# Patient Record
Sex: MALE | Race: WHITE | NOT HISPANIC OR LATINO | Employment: UNEMPLOYED | URBAN - NONMETROPOLITAN AREA
[De-identification: names, ages, dates, MRNs, and addresses within clinical notes are randomized per-mention and may not be internally consistent; named-entity substitution may affect disease eponyms.]

---

## 2017-01-01 ENCOUNTER — HOSPITAL ENCOUNTER (OUTPATIENT)
Dept: OTHER | Facility: HOSPITAL | Age: 62
Setting detail: RADIATION/ONCOLOGY SERIES
Discharge: HOME | End: 2017-01-20
Attending: RADIOLOGY | Admitting: RADIOLOGY

## 2017-01-04 LAB
BASOPHILS # BLD AUTO: 0.02 X1000/UL (ref 0–0.2)
BASOPHILS NFR BLD AUTO: 0.5 % (ref 0–2)
CONV AUTO IG#: 0.01 X1000/UL (ref 0.01–0.02)
CONV AUTO IG%: 0.2 % (ref 0–0.5)
EOSINOPHIL # BLD AUTO: 0.07 X1000/UL (ref 0–0.7)
EOSINOPHIL NFR BLD AUTO: 1.7 % (ref 0–7)
ERYTHROCYTE [DISTWIDTH] IN BLOOD: 15.1 % (ref 11.5–14.5)
GRANULOCYTES # BLD AUTO: 3.08 X1000/UL (ref 2–8.6)
GRANULOCYTES NFR BLD AUTO: 75.7 % (ref 37–80)
HCT VFR BLD CALC: 34.7 % (ref 39–49)
HGB BLD-MCNC: 11.8 GM/DL (ref 13.7–17.3)
LYMPHOCYTES # BLD AUTO: 0.54 X1000/UL (ref 0.6–4.2)
LYMPHOCYTES NFR BLD AUTO: 13.3 % (ref 10–50)
MCH RBC QN: 31.2 PG (ref 26–34)
MCHC RBC-ENTMCNC: 34 GM/DL (ref 31.5–36.3)
MCV RBC: 91.8 FL (ref 80–98)
MONOCYTES # BLD AUTO: 0.35 X1000/UL (ref 0–0.9)
MONOCYTES NFR BLD AUTO: 8.6 % (ref 0–12)
PLATELET # BLD: 210 X1000/MM3 (ref 150–450)
PMV BLD: 9.2 FL (ref 8–12)
RBC # BLD: 3.78 MEGA/MM3 (ref 4.37–5.74)
WBC # BLD: 4.1 X1000/UL (ref 3.2–9.8)

## 2017-01-23 RX ORDER — OXYCODONE AND ACETAMINOPHEN 10; 325 MG/1; MG/1
1 TABLET ORAL EVERY 6 HOURS PRN
Qty: 10 TABLET | Refills: 0 | OUTPATIENT
Start: 2017-01-23 | End: 2017-01-23 | Stop reason: SDUPTHER

## 2017-01-23 RX ORDER — OXYCODONE AND ACETAMINOPHEN 10; 325 MG/1; MG/1
1 TABLET ORAL EVERY 6 HOURS PRN
Qty: 10 TABLET | Refills: 0 | OUTPATIENT
Start: 2017-01-23 | End: 2017-01-23

## 2017-03-27 ENCOUNTER — HOSPITAL ENCOUNTER (OUTPATIENT)
Dept: GENERAL RADIOLOGY | Facility: HOSPITAL | Age: 62
Discharge: HOME | End: 2017-03-27

## 2017-03-27 DIAGNOSIS — R13.10 DIFFICULTY IN SWALLOWING: ICD-10-CM

## 2017-03-27 DIAGNOSIS — R13.12 OROPHARYNGEAL DYSPHAGIA: ICD-10-CM

## 2017-03-27 PROCEDURE — G8998 SWALLOW D/C STATUS: HCPCS | Performed by: SPEECH-LANGUAGE PATHOLOGIST

## 2017-03-27 PROCEDURE — G8996 SWALLOW CURRENT STATUS: HCPCS | Performed by: SPEECH-LANGUAGE PATHOLOGIST

## 2017-03-27 PROCEDURE — 92611 MOTION FLUOROSCOPY/SWALLOW: CPT | Performed by: SPEECH-LANGUAGE PATHOLOGIST

## 2017-03-27 PROCEDURE — 74230 X-RAY XM SWLNG FUNCJ C+: CPT

## 2017-03-27 PROCEDURE — G8997 SWALLOW GOAL STATUS: HCPCS | Performed by: SPEECH-LANGUAGE PATHOLOGIST

## 2017-03-27 NOTE — PROGRESS NOTES
Outpatient Speech Language Pathology   Adult Swallow Initial Evaluation  [facility]     Patient Name: Gilberto Londono  : 1955  MRN: [id number]  Today's Date: 3/27/2017         Visit Date: 2017     Modified Barium Swallow completed this date. Pt reports burning sensation however no coughing or choking during meals. Pt post radiation from cancer of the tonsil with involvement of left portion hard palate, tongue, and jaw. Pt presented with thin by cup, nectar by cup, pudding, and deli meat. Pt awaiting dentures at this time. Pt presents with piecemeal swallow, pt exhibits laryngeal penetration on most trials and various consistencies, increased pharyngeal residue as trials progressed; double swallow did aid in clearance of residue; and residue in valleculae noted with meat textures. No difference in laryngeal penetration from thin vs nectar thick consistenices, chin tuck did improve and decrease amount of penetration. No cough or response to the penetration. Pt is reporting a burning sensation but this noted as the penetration episodes. A double swallow and chin tuck were recommended post MBS. SLP recommends continue current diet and use small sips, chin tuck and double swallow to increase swallow safety.  Recommend outpatient speech therapy follow up for dysphagia.          Past Medical History:   Diagnosis Date   • Acute bronchitis    • Acute otitis externa     RIGHT      • Cough    • Impacted cerumen     RIGHT    • Lesion of oral mucosa    • Tobacco dependence syndrome    • Upper respiratory infection         History reviewed. No pertinent surgical history.      Visit Dx:     ICD-10-CM ICD-9-CM   1. Difficulty in swallowing R13.10 787.20   2. Oropharyngeal dysphagia R13.12 787.22                                         OP SLP Assessment/Plan - 17 1652     SLP Assessment    Functional Problems --  -EK      User Key  (r) = Recorded By, (t) = Taken By, (c) = Cosigned By    Initials Name Provider  Type    EK MICHELLE Espitia Speech and Language Pathologist                    Time Calculation:   SLP Start Time: 1030  SLP Stop Time: 1050  SLP Time Calculation (min): 20 min    Therapy Charges for Today     Code Description Service Date Service Provider Modifiers Qty    25413017777 HC ST SWALLOWING CURRENT STATUS 3/27/2017 MICHELLE Espitia GN, CJ 1    09788623955 HC ST SWALLOWING PROJECTED 3/27/2017 MICHELLE Espitia, CJ 1    70298081404 HC ST SWALLOWING DISCHARGE 3/27/2017 MICHELLE Espitia, CJ 1    38881835056 HC ST MOTION FLUORO EVAL SWALLOW 2 3/27/2017 MICHELLE Espitia GN 1          SLP G-Codes  Functional Limitations: Swallowing  Swallow Current Status (): At least 20 percent but less than 40 percent impaired, limited or restricted  Swallow Goal Status (): At least 20 percent but less than 40 percent impaired, limited or restricted  Swallow Discharge Status (): At least 20 percent but less than 40 percent impaired, limited or restricted        MICHELLE Espitia  3/27/2017

## 2017-03-31 ENCOUNTER — TRANSCRIBE ORDERS (OUTPATIENT)
Dept: SPEECH THERAPY | Facility: HOSPITAL | Age: 62
End: 2017-03-31

## 2017-03-31 DIAGNOSIS — C09.9 MALIGNANT NEOPLASM OF TONSIL (HCC): ICD-10-CM

## 2017-03-31 DIAGNOSIS — R13.10 DYSPHAGIA, UNSPECIFIED TYPE: Primary | ICD-10-CM

## 2017-04-12 ENCOUNTER — OFFICE VISIT (OUTPATIENT)
Dept: ONCOLOGY | Facility: CLINIC | Age: 62
End: 2017-04-12

## 2017-04-12 ENCOUNTER — LAB (OUTPATIENT)
Dept: ONCOLOGY | Facility: HOSPITAL | Age: 62
End: 2017-04-12

## 2017-04-12 VITALS
TEMPERATURE: 97.9 F | HEART RATE: 83 BPM | WEIGHT: 127 LBS | SYSTOLIC BLOOD PRESSURE: 106 MMHG | DIASTOLIC BLOOD PRESSURE: 68 MMHG | BODY MASS INDEX: 18.18 KG/M2 | HEIGHT: 70 IN

## 2017-04-12 DIAGNOSIS — C09.0 CANCER OF TONSILLAR FOSSA (HCC): Primary | ICD-10-CM

## 2017-04-12 DIAGNOSIS — C09.0 CANCER OF TONSILLAR FOSSA (HCC): ICD-10-CM

## 2017-04-12 LAB
ALBUMIN SERPL-MCNC: 4.6 G/DL (ref 3.4–4.8)
ALBUMIN/GLOB SERPL: 1.6 G/DL (ref 1.1–1.8)
ALP SERPL-CCNC: 74 U/L (ref 38–126)
ALT SERPL W P-5'-P-CCNC: 27 U/L (ref 21–72)
ANION GAP SERPL CALCULATED.3IONS-SCNC: 12 MMOL/L (ref 5–15)
AST SERPL-CCNC: 33 U/L (ref 17–59)
BASOPHILS # BLD AUTO: 0.03 10*3/MM3 (ref 0–0.2)
BASOPHILS NFR BLD AUTO: 0.6 % (ref 0–2)
BILIRUB SERPL-MCNC: 0.4 MG/DL (ref 0.2–1.3)
BUN BLD-MCNC: 19 MG/DL (ref 7–21)
BUN/CREAT SERPL: 23.5 (ref 7–25)
CALCIUM SPEC-SCNC: 9.3 MG/DL (ref 8.4–10.2)
CHLORIDE SERPL-SCNC: 100 MMOL/L (ref 95–110)
CO2 SERPL-SCNC: 28 MMOL/L (ref 22–31)
CREAT BLD-MCNC: 0.81 MG/DL (ref 0.7–1.3)
DEPRECATED RDW RBC AUTO: 40.6 FL (ref 35.1–43.9)
EOSINOPHIL # BLD AUTO: 0.09 10*3/MM3 (ref 0–0.7)
EOSINOPHIL NFR BLD AUTO: 1.8 % (ref 0–7)
ERYTHROCYTE [DISTWIDTH] IN BLOOD BY AUTOMATED COUNT: 12 % (ref 11.5–14.5)
GFR SERPL CREATININE-BSD FRML MDRD: 96 ML/MIN/1.73 (ref 49–113)
GLOBULIN UR ELPH-MCNC: 2.8 GM/DL (ref 2.3–3.5)
GLUCOSE BLD-MCNC: 90 MG/DL (ref 60–100)
HCT VFR BLD AUTO: 38.8 % (ref 39–49)
HGB BLD-MCNC: 13.3 G/DL (ref 13.7–17.3)
IMM GRANULOCYTES # BLD: 0.01 10*3/MM3 (ref 0–0.02)
IMM GRANULOCYTES NFR BLD: 0.2 % (ref 0–0.5)
LYMPHOCYTES # BLD AUTO: 0.9 10*3/MM3 (ref 0.6–4.2)
LYMPHOCYTES NFR BLD AUTO: 17.6 % (ref 10–50)
MCH RBC QN AUTO: 31.7 PG (ref 26.5–34)
MCHC RBC AUTO-ENTMCNC: 34.3 G/DL (ref 31.5–36.3)
MCV RBC AUTO: 92.6 FL (ref 80–98)
MONOCYTES # BLD AUTO: 0.52 10*3/MM3 (ref 0–0.9)
MONOCYTES NFR BLD AUTO: 10.2 % (ref 0–12)
NEUTROPHILS # BLD AUTO: 3.55 10*3/MM3 (ref 2–8.6)
NEUTROPHILS NFR BLD AUTO: 69.6 % (ref 37–80)
PLATELET # BLD AUTO: 177 10*3/MM3 (ref 150–450)
PMV BLD AUTO: 9 FL (ref 8–12)
POTASSIUM BLD-SCNC: 4.6 MMOL/L (ref 3.5–5.1)
PROT SERPL-MCNC: 7.4 G/DL (ref 6.3–8.6)
RBC # BLD AUTO: 4.19 10*6/MM3 (ref 4.37–5.74)
SODIUM BLD-SCNC: 140 MMOL/L (ref 137–145)
WBC NRBC COR # BLD: 5.1 10*3/MM3 (ref 3.2–9.8)

## 2017-04-12 PROCEDURE — 99213 OFFICE O/P EST LOW 20 MIN: CPT | Performed by: INTERNAL MEDICINE

## 2017-04-12 PROCEDURE — G0463 HOSPITAL OUTPT CLINIC VISIT: HCPCS | Performed by: INTERNAL MEDICINE

## 2017-04-12 PROCEDURE — 85025 COMPLETE CBC W/AUTO DIFF WBC: CPT | Performed by: INTERNAL MEDICINE

## 2017-04-12 PROCEDURE — 36415 COLL VENOUS BLD VENIPUNCTURE: CPT | Performed by: INTERNAL MEDICINE

## 2017-04-12 PROCEDURE — 80053 COMPREHEN METABOLIC PANEL: CPT | Performed by: INTERNAL MEDICINE

## 2017-04-12 RX ORDER — ALPRAZOLAM 1 MG/1
TABLET ORAL
Refills: 0 | COMMUNITY
Start: 2017-02-25 | End: 2018-03-16

## 2017-04-12 RX ORDER — OXYCODONE AND ACETAMINOPHEN 10; 325 MG/1; MG/1
TABLET ORAL
Refills: 0 | COMMUNITY
Start: 2017-03-02

## 2017-04-12 RX ORDER — PROMETHAZINE HYDROCHLORIDE 25 MG/1
TABLET ORAL
Refills: 0 | COMMUNITY
Start: 2017-02-21

## 2017-04-12 NOTE — PROGRESS NOTES
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  For which patient underwent left tonsillectomy with neck dissection by Dr. Seymour in [place].  Subsequently patient was seen in consultation here by Dr. Mccracken and was started on concurrent chemotherapy radiation therapy with weekly carboplatin and Taxol.  Patient could not tolerate more than 3 dose of weekly carboplatin and Taxol and subsequently ended up finishing his radiation without chemotherapy in January 2017.  Patient is here for follow-up visit today.  Still complains of some burning sensation on the left side of the tongue and hearing difficulty in the left ear.  Denies any abnormal swollen and anywhere in the body.  States his appetite is returning and he has gained more than 10 pounds since finishing treatment.    PAST MEDICAL HISTORY:    Past Medical History:   Diagnosis Date   • Acute bronchitis    • Acute otitis externa     RIGHT      • Cough    • Impacted cerumen     RIGHT    • Lesion of oral mucosa    • Tobacco dependence syndrome    • Upper respiratory infection        SOCIAL HISTORY:    Social History   Substance Use Topics   • Smoking status: Former Smoker     Packs/day: 1.50     Years: 44.00     Types: Cigarettes     Quit date: 9/20/2016   • Smokeless tobacco: None   • Alcohol use No       Surgical History :  No past surgical history on file.    ALLERGIES:    Allergies   Allergen Reactions   • Codeine Nausea And Vomiting       REVIEW OF SYSTEMS:      CONSTITUTIONAL:  No fever, chills, or night sweats.     HEENT: Complains of difficulty swallowing.  Complains of burning sensation on the left side of tongue.  No epistaxis, mouth sores.    RESPIRATORY:  No new shortness of breath or cough at  "present.    CARDIOVASCULAR:  No chest pain or palpitations.    GASTROINTESTINAL:  No abdominal pain, nausea, vomiting, or blood in the stool.    GENITOURINARY:  No dysuria or hematuria.    MUSCULOSKELETAL:  No any new back pain or arthralgias.     NEUROLOGICAL:  No tingling or numbness. No new headache or dizziness.     LYMPHATICS:  Denies any abnormal swollen and anywhere in the body.    SKIN:  Erythema on the neck on the left side.        PHYSICAL EXAMINATION:      VITAL SIGNS:  /68  Pulse 83  Temp 97.9 °F (36.6 °C)  Ht 70\" (177.8 cm)  Wt 127 lb (57.6 kg)  BMI 18.22 kg/m2    GENERAL:  Not in any distress.    HEENT:  Normocephalic, Atraumatic.Mild Conjunctival pallor. No icterus. Extraocular Movements Intact. No Fascial Asymmetry noted.    NECK:  No adenopathy. No JVD.  Evidence of erythema and skin changes from radiation on the left side of neck.    RESPIRATORY:  Fair air entry bilateral. No rhonchi or wheezing.    CARDIOVASCULAR:  S1, S2. Regular rate and rhythm. No murmur or gallop appreciated.    ABDOMEN:  Soft, obese, nontender. Bowel sounds present in all four quadrants.  No organomegaly appreciated.    EXTREMITIES:  No edema.No Calf Tenderness.    NEUROLOGIC:  Alert, awake and oriented ×3.  No  Motor or sensory deficit appreciated. Cranial Nerves 2-12 grossly intact.          DIAGNOSTIC DATA:    Glucose   Date Value Ref Range Status   04/12/2017 90 60 - 100 mg/dL Final     Sodium   Date Value Ref Range Status   04/12/2017 140 137 - 145 mmol/L Final     Potassium   Date Value Ref Range Status   04/12/2017 4.6 3.5 - 5.1 mmol/L Final     CO2   Date Value Ref Range Status   04/12/2017 28.0 22.0 - 31.0 mmol/L Final     Chloride   Date Value Ref Range Status   04/12/2017 100 95 - 110 mmol/L Final     Anion Gap   Date Value Ref Range Status   04/12/2017 12.0 5.0 - 15.0 mmol/L Final     Creatinine   Date Value Ref Range Status   04/12/2017 0.81 0.70 - 1.30 mg/dL Final     BUN   Date Value Ref Range Status "   04/12/2017 19 7 - 21 mg/dL Final     BUN/Creatinine Ratio   Date Value Ref Range Status   04/12/2017 23.5 7.0 - 25.0 Final     Calcium   Date Value Ref Range Status   04/12/2017 9.3 8.4 - 10.2 mg/dL Final     eGFR Non  Amer   Date Value Ref Range Status   04/12/2017 96 49 - 113 mL/min/1.73 Final     Alkaline Phosphatase   Date Value Ref Range Status   04/12/2017 74 38 - 126 U/L Final     Total Protein   Date Value Ref Range Status   04/12/2017 7.4 6.3 - 8.6 g/dL Final     ALT (SGPT)   Date Value Ref Range Status   04/12/2017 27 21 - 72 U/L Final     AST (SGOT)   Date Value Ref Range Status   04/12/2017 33 17 - 59 U/L Final     Total Bilirubin   Date Value Ref Range Status   04/12/2017 0.4 0.2 - 1.3 mg/dL Final     Albumin   Date Value Ref Range Status   04/12/2017 4.60 3.40 - 4.80 g/dL Final     Globulin   Date Value Ref Range Status   04/12/2017 2.8 2.3 - 3.5 gm/dL Final     A/G Ratio   Date Value Ref Range Status   04/12/2017 1.6 1.1 - 1.8 g/dL Final     Lab Results   Component Value Date    WBC 5.10 04/12/2017    HGB 13.3 (L) 04/12/2017    HCT 38.8 (L) 04/12/2017    MCV 92.6 04/12/2017     04/12/2017     Lab Results   Component Value Date    NEUTROABS 3.55 04/12/2017           ASSESSMENT AND PLAN:      1.  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  Patient did have 2 lymph nodes positive at the time of surgery.  Patient was seen in consultation by  on October 21, 2016.  Patient did get 3 weekly dose of carboplatin and Taxol from November 21, 2016 until December 5, 2016 with radiation.  After that in view of side effects from chemotherapy patient decided not to undergo any more chemotherapy and finished radiation on tin 2017 on January 19, 2017.  Patient was recently seen by Dr. Gant in [place] and had a ENT exam done which showed did not reveal any evidence of disease.  At this point we will continue with clinical surveillance.  We  will see him back in about 4 months with a repeat CBC and CMP on that day.  Patient was encouraged to keep following up with Dr. Gant in [place] as well as Dr. Justice Shelley.    2.  History of nicotine addiction    3.  Rheumatoid arthritis    4.  Health maintenance: Patient does not smoke.  He remains full code.         Keagan Alberts MD  4/12/2017  3:39 PM        EMR Dragon/Transcription disclaimer:   Much of this encounter note is an electronic transcription/translation of spoken language to printed text. The electronic translation of spoken language may permit erroneous, or at times, nonsensical words or phrases to be inadvertently transcribed; Although I have reviewed the note for such errors, some may still exist.

## 2017-04-24 ENCOUNTER — APPOINTMENT (OUTPATIENT)
Dept: SPEECH THERAPY | Facility: HOSPITAL | Age: 62
End: 2017-04-24

## 2017-08-07 ENCOUNTER — TELEPHONE (OUTPATIENT)
Dept: GENERAL RADIOLOGY | Facility: HOSPITAL | Age: 62
End: 2017-08-07

## 2017-08-28 ENCOUNTER — DOCUMENTATION (OUTPATIENT)
Dept: NUTRITION | Facility: HOSPITAL | Age: 62
End: 2017-08-28

## 2017-08-29 ENCOUNTER — OFFICE VISIT (OUTPATIENT)
Dept: GASTROENTEROLOGY | Facility: CLINIC | Age: 62
End: 2017-08-29

## 2017-08-29 VITALS
HEIGHT: 70 IN | HEART RATE: 65 BPM | WEIGHT: 130.8 LBS | BODY MASS INDEX: 18.73 KG/M2 | DIASTOLIC BLOOD PRESSURE: 70 MMHG | SYSTOLIC BLOOD PRESSURE: 129 MMHG

## 2017-08-29 DIAGNOSIS — R63.4 WEIGHT LOSS, ABNORMAL: Primary | ICD-10-CM

## 2017-08-29 PROCEDURE — 99213 OFFICE O/P EST LOW 20 MIN: CPT | Performed by: INTERNAL MEDICINE

## 2017-08-29 NOTE — PROGRESS NOTES
[facility]      Chief Complaint:   Chief Complaint   Patient presents with   • Abdominal Pain     around peg tube   • GI Problem     peg tube removal       Subjective     HPI:   Patient for PEG removal.  Patient states no abdominal pain with the PEG but does get some discharge from around patient is no longer using his PEG.    Plan; we'll remove the PEG today as patient is no longer using this.    Past Medical History:   Past Medical History:   Diagnosis Date   • Acute bronchitis    • Acute otitis externa     RIGHT      • Cough    • Impacted cerumen     RIGHT    • Lesion of oral mucosa    • Tobacco dependence syndrome    • Upper respiratory infection        Family History:  History reviewed. No pertinent family history.    Social History:   reports that he quit smoking about 11 months ago. His smoking use included Cigarettes. He has a 66.00 pack-year smoking history. He has never used smokeless tobacco. He reports that he does not drink alcohol or use illicit drugs.    Medications:   Current Outpatient Prescriptions   Medication Sig Dispense Refill   • ALPRAZolam (XANAX) 1 MG tablet TAKE 1 TABLET FOUR TIMES DAILY FOR PAIN  0   • gabapentin (NEURONTIN) 600 MG tablet Take 600 mg by mouth 3 (three) times a day as needed.     • oxyCODONE-acetaminophen (PERCOCET)  MG per tablet TAKE 1 TABLET BY MOUTH SIX TIMES EVERY DAY FOR 28 DAYS  0   • promethazine (PHENERGAN) 25 MG tablet TAKE 1 TABLET EVERY 4 TO 6 HOURS  0     No current facility-administered medications for this visit.        Allergies:  Codeine    ROS:    Review of Systems   Constitutional: Negative for activity change, appetite change, chills, diaphoresis, fatigue, fever and unexpected weight change.   HENT: Negative for sore throat and trouble swallowing.    Respiratory: Negative for shortness of breath.    Gastrointestinal: Negative for abdominal distention, abdominal pain, anal bleeding, blood in stool, constipation, diarrhea,  "nausea, rectal pain and vomiting.   Musculoskeletal: Negative for arthralgias.   Skin: Negative for pallor.   Neurological: Negative for light-headedness.     Objective     Blood pressure 129/70, pulse 65, height 70\" (177.8 cm), weight 130 lb 12.8 oz (59.3 kg).    Physical Exam   Constitutional: He is oriented to person, place, and time. He appears well-developed and well-nourished. No distress.   HENT:   Head: Normocephalic and atraumatic.   Cardiovascular: Normal rate, regular rhythm, normal heart sounds and intact distal pulses.  Exam reveals no gallop and no friction rub.    No murmur heard.  Pulmonary/Chest: Breath sounds normal. No respiratory distress. He has no wheezes. He has no rales. He exhibits no tenderness.   Abdominal: Soft. Bowel sounds are normal. He exhibits no distension and no mass. There is no tenderness. There is no rebound and no guarding. No hernia.   Musculoskeletal: Normal range of motion. He exhibits no edema.   Neurological: He is alert and oriented to person, place, and time.   Skin: Skin is warm and dry. No rash noted. He is not diaphoretic. No erythema. No pallor.   Psychiatric: He has a normal mood and affect. His behavior is normal. Judgment and thought content normal.        Assessment/Plan   Gilberto was seen today for abdominal pain and gi problem.    Diagnoses and all orders for this visit:    Weight loss, abnormal      * Surgery not found *     Diagnosis Plan   1. Weight loss, abnormal         Anticipated Surgical Procedure:  No orders of the defined types were placed in this encounter.      The risks, benefits, and alternatives of this procedure have been discussed with the patient or the responsible party- the patient understands and agrees to proceed.                                                                "

## 2018-03-05 ENCOUNTER — TELEPHONE (OUTPATIENT)
Dept: ONCOLOGY | Facility: HOSPITAL | Age: 63
End: 2018-03-05

## 2018-03-05 NOTE — TELEPHONE ENCOUNTER
Patient's daughter called and said that patient has been having dizziness and wanted to be seen today. Spoke with Dr. Alberts and he asks for patient to go to the ER to be evaluated. Informed patient and daughter of this. Patient says it has been happening on and off but that he feels better today. Gave patient an appointment to see Dr. Alberts. Patient states understanding and says he will go to the ER.

## 2018-03-06 ENCOUNTER — APPOINTMENT (OUTPATIENT)
Dept: CT IMAGING | Facility: HOSPITAL | Age: 63
End: 2018-03-06

## 2018-03-06 ENCOUNTER — HOSPITAL ENCOUNTER (EMERGENCY)
Facility: HOSPITAL | Age: 63
Discharge: LEFT BEFORE DISCHARGE | End: 2018-03-06
Attending: FAMILY MEDICINE | Admitting: FAMILY MEDICINE

## 2018-03-06 VITALS
BODY MASS INDEX: 20.19 KG/M2 | SYSTOLIC BLOOD PRESSURE: 134 MMHG | HEART RATE: 59 BPM | TEMPERATURE: 98.2 F | WEIGHT: 141 LBS | RESPIRATION RATE: 18 BRPM | OXYGEN SATURATION: 100 % | HEIGHT: 70 IN | DIASTOLIC BLOOD PRESSURE: 74 MMHG

## 2018-03-06 DIAGNOSIS — R42 DIZZINESS: Primary | ICD-10-CM

## 2018-03-06 PROCEDURE — 99282 EMERGENCY DEPT VISIT SF MDM: CPT

## 2018-03-06 NOTE — ED PROVIDER NOTES
Subjective   Patient is a 62 y.o. male presenting with dizziness.   History provided by:  Patient  Dizziness   Quality:  Lightheadedness  Severity:  Mild  Onset quality:  Sudden  Duration: minutes.  Timing: once.  Progression:  Resolved  Chronicity:  New  Context: not when bending over, not with loss of consciousness, not with physical activity and not when standing up    Relieved by:  Nothing  Worsened by:  Nothing  Ineffective treatments:  None tried  Associated symptoms: hearing loss (chronic) and nausea (mild during episode)    Associated symptoms: no blood in stool, no headaches, no palpitations, no shortness of breath, no syncope, no tinnitus, no vision changes, no vomiting and no weakness    Risk factors: hx of vertigo    Risk factors: no heart disease, no hx of stroke and no new medications        Review of Systems   Constitutional: Negative for activity change, appetite change, fatigue, fever and unexpected weight change.   HENT: Positive for hearing loss (chronic). Negative for tinnitus and trouble swallowing.    Eyes: Negative for visual disturbance.   Respiratory: Negative for shortness of breath.    Cardiovascular: Negative for palpitations and syncope.   Gastrointestinal: Positive for nausea (mild during episode). Negative for blood in stool and vomiting.   Musculoskeletal: Negative for neck pain and neck stiffness.   Neurological: Positive for dizziness and light-headedness. Negative for tremors, seizures, syncope, facial asymmetry, speech difficulty, weakness, numbness and headaches.       Past Medical History:   Diagnosis Date   • Acute bronchitis    • Acute otitis externa     RIGHT      • Cancer    • Cough    • Impacted cerumen     RIGHT    • Lesion of oral mucosa    • Tobacco dependence syndrome    • Upper respiratory infection        Allergies   Allergen Reactions   • Codeine Nausea And Vomiting       Past Surgical History:   Procedure Laterality Date   • TONSILLECTOMY     • UPPER  GASTROINTESTINAL ENDOSCOPY  12/08/2016       History reviewed. No pertinent family history.    Social History     Social History   • Marital status:      Social History Main Topics   • Smoking status: Former Smoker     Packs/day: 1.50     Years: 44.00     Types: Cigarettes     Quit date: 9/20/2016   • Smokeless tobacco: Never Used   • Alcohol use No   • Drug use: No   • Sexual activity: Defer           Objective   Physical Exam   Constitutional: He is oriented to person, place, and time. He appears well-developed and well-nourished.   HENT:   Head: Normocephalic and atraumatic.   Eyes: Conjunctivae and EOM are normal. Pupils are equal, round, and reactive to light.   Neck: Normal range of motion. Neck supple. No thyromegaly present.   Cardiovascular: Normal rate, regular rhythm, normal heart sounds and intact distal pulses.  Exam reveals no gallop and no friction rub.    No murmur heard.  Pulmonary/Chest: Effort normal and breath sounds normal.   Musculoskeletal: Normal range of motion. He exhibits no edema, tenderness or deformity.   Lymphadenopathy:     He has no cervical adenopathy.   Neurological: He is alert and oriented to person, place, and time. No cranial nerve deficit.   Psychiatric: He has a normal mood and affect. His behavior is normal. Judgment and thought content normal.       Procedures        Vitals:    03/06/18 1606   BP: 134/74   Pulse: 59   Resp: 18   Temp:    SpO2: 100%         ED Course  ED Course   Value Comment By Time   CT Head Without Contrast (Reviewed) Chris Márquez MD 03/06 1623   CT Head Without Contrast (Reviewed) Chris Márquez MD 03/06 1623    62 year old male with PMH of left sided tonsillar cancer s/p radiation therapy 35x, chemo x2 in remission.  Presented with dizziness episode on Sunday which lasted about 15 minutes, did not lose consiousness. Is a patient of Dr. Alberts, hemeoncology. CT head ordered per Dr. Alberts recommendations to look for metastasis. CT head was not able to  "be completed as patient had to leave due to a \"home emergency\". Patient left AMA. Was informed he will need to follow up with PCP, Dr. Alberts and have a head CT completed.         This document has been electronically signed by Chris Márquez MD on March 7, 2018 10:55 AM                  MDM  Number of Diagnoses or Management Options  Dizziness: new and requires workup     Amount and/or Complexity of Data Reviewed  Tests in the radiology section of CPT®: ordered    Risk of Complications, Morbidity, and/or Mortality  Presenting problems: low    Critical Care  Total time providing critical care: < 30 minutes    Patient Progress  Patient progress: stable      Final diagnoses:   Dizziness            Chris Márquez MD  Resident  03/07/18 1056    "

## 2018-03-06 NOTE — ED NOTES
Pt is stating that he has had an emergency come up and he has to leave now.      Laura Garcia RN  03/06/18 [id number]

## 2018-03-16 ENCOUNTER — LAB (OUTPATIENT)
Dept: ONCOLOGY | Facility: HOSPITAL | Age: 63
End: 2018-03-16

## 2018-03-16 ENCOUNTER — OFFICE VISIT (OUTPATIENT)
Dept: ONCOLOGY | Facility: CLINIC | Age: 63
End: 2018-03-16

## 2018-03-16 VITALS
HEART RATE: 62 BPM | HEIGHT: 70 IN | DIASTOLIC BLOOD PRESSURE: 83 MMHG | WEIGHT: 141.31 LBS | BODY MASS INDEX: 20.23 KG/M2 | RESPIRATION RATE: 16 BRPM | SYSTOLIC BLOOD PRESSURE: 143 MMHG | TEMPERATURE: 97.9 F

## 2018-03-16 DIAGNOSIS — C09.0 CANCER OF TONSILLAR FOSSA (HCC): ICD-10-CM

## 2018-03-16 LAB
ALBUMIN SERPL-MCNC: 4.6 G/DL (ref 3.4–4.8)
ALBUMIN/GLOB SERPL: 1.5 G/DL (ref 1.1–1.8)
ALP SERPL-CCNC: 89 U/L (ref 38–126)
ALT SERPL W P-5'-P-CCNC: 32 U/L (ref 21–72)
ANION GAP SERPL CALCULATED.3IONS-SCNC: 14 MMOL/L (ref 5–15)
AST SERPL-CCNC: 26 U/L (ref 17–59)
BASOPHILS # BLD AUTO: 0.06 10*3/MM3 (ref 0–0.2)
BASOPHILS NFR BLD AUTO: 1.2 % (ref 0–2)
BILIRUB SERPL-MCNC: 0.7 MG/DL (ref 0.2–1.3)
BUN BLD-MCNC: 13 MG/DL (ref 7–21)
BUN/CREAT SERPL: 13.7 (ref 7–25)
CALCIUM SPEC-SCNC: 9.4 MG/DL (ref 8.4–10.2)
CHLORIDE SERPL-SCNC: 100 MMOL/L (ref 95–110)
CO2 SERPL-SCNC: 29 MMOL/L (ref 22–31)
CREAT BLD-MCNC: 0.95 MG/DL (ref 0.7–1.3)
DEPRECATED RDW RBC AUTO: 43 FL (ref 35.1–43.9)
EOSINOPHIL # BLD AUTO: 0.05 10*3/MM3 (ref 0–0.7)
EOSINOPHIL NFR BLD AUTO: 1 % (ref 0–7)
ERYTHROCYTE [DISTWIDTH] IN BLOOD BY AUTOMATED COUNT: 13 % (ref 11.5–14.5)
GFR SERPL CREATININE-BSD FRML MDRD: 80 ML/MIN/1.73 (ref 49–113)
GLOBULIN UR ELPH-MCNC: 3.1 GM/DL (ref 2.3–3.5)
GLUCOSE BLD-MCNC: 92 MG/DL (ref 60–100)
HCT VFR BLD AUTO: 44 % (ref 39–49)
HGB BLD-MCNC: 15.2 G/DL (ref 13.7–17.3)
IMM GRANULOCYTES # BLD: 0.01 10*3/MM3 (ref 0–0.02)
IMM GRANULOCYTES NFR BLD: 0.2 % (ref 0–0.5)
LYMPHOCYTES # BLD AUTO: 0.74 10*3/MM3 (ref 0.6–4.2)
LYMPHOCYTES NFR BLD AUTO: 15.3 % (ref 10–50)
MCH RBC QN AUTO: 31.2 PG (ref 26.5–34)
MCHC RBC AUTO-ENTMCNC: 34.5 G/DL (ref 31.5–36.3)
MCV RBC AUTO: 90.3 FL (ref 80–98)
MONOCYTES # BLD AUTO: 0.54 10*3/MM3 (ref 0–0.9)
MONOCYTES NFR BLD AUTO: 11.2 % (ref 0–12)
NEUTROPHILS # BLD AUTO: 3.43 10*3/MM3 (ref 2–8.6)
NEUTROPHILS NFR BLD AUTO: 71.1 % (ref 37–80)
PLATELET # BLD AUTO: 186 10*3/MM3 (ref 150–450)
PMV BLD AUTO: 9 FL (ref 8–12)
POTASSIUM BLD-SCNC: 4.1 MMOL/L (ref 3.5–5.1)
PROT SERPL-MCNC: 7.7 G/DL (ref 6.3–8.6)
RBC # BLD AUTO: 4.87 10*6/MM3 (ref 4.37–5.74)
SODIUM BLD-SCNC: 143 MMOL/L (ref 137–145)
WBC NRBC COR # BLD: 4.83 10*3/MM3 (ref 3.2–9.8)

## 2018-03-16 PROCEDURE — 99213 OFFICE O/P EST LOW 20 MIN: CPT | Performed by: INTERNAL MEDICINE

## 2018-03-16 PROCEDURE — 85025 COMPLETE CBC W/AUTO DIFF WBC: CPT | Performed by: INTERNAL MEDICINE

## 2018-03-16 PROCEDURE — 80053 COMPREHEN METABOLIC PANEL: CPT | Performed by: INTERNAL MEDICINE

## 2018-03-16 PROCEDURE — G0463 HOSPITAL OUTPT CLINIC VISIT: HCPCS | Performed by: INTERNAL MEDICINE

## 2018-03-16 NOTE — PROGRESS NOTES
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  For which patient underwent left tonsillectomy with neck dissection by Dr. Seymour in [place].  Subsequently patient was seen in consultation here by Dr. Mccracken and was started on concurrent chemotherapy radiation therapy with weekly carboplatin and Taxol.  Patient could not tolerate more than 3 dose of weekly carboplatin and Taxol and subsequently ended up finishing his radiation without chemotherapy in January 2017.  Patient was lost to follow-up for almost one year.  He was last seen here in clinic in April 2017 after that he did not keep his follow-up appointment.  Patient was recently seen at [facility] ER on 6 2018 with complaint of dizziness.  Patient signed out AMA before workup was done.  States his dizziness is significantly improved since then.  Complains of pressure-like feeling in bilateral year.  States he also has a history of vertigo.  Denies any problem with vision or any nausea or vomiting.  Denies any new lump in the neck area.      PAST MEDICAL HISTORY:    Past Medical History:   Diagnosis Date   • Acute bronchitis    • Acute otitis externa     RIGHT      • Cancer    • Cough    • Impacted cerumen     RIGHT    • Lesion of oral mucosa    • Tobacco dependence syndrome    • Upper respiratory infection        SOCIAL HISTORY:    Social History   Substance Use Topics   • Smoking status: Former Smoker     Packs/day: 1.50     Years: 44.00     Types: Cigarettes     Quit date: 9/20/2016   • Smokeless tobacco: Never Used   • Alcohol use No       Surgical History :  Past Surgical History:   Procedure Laterality Date   • TONSILLECTOMY     • UPPER GASTROINTESTINAL ENDOSCOPY  12/08/2016  "      ALLERGIES:    Allergies   Allergen Reactions   • Codeine Nausea And Vomiting       REVIEW OF SYSTEMS:      CONSTITUTIONAL:  States his appetite is good and has gained a few pounds since last clinic visit.  No fever, chills, or night sweats.     HEENT:  No epistaxis, mouth sores or difficulty swallowing.    RESPIRATORY:  No new shortness of breath or cough at present.    CARDIOVASCULAR:  No chest pain or palpitations.    GASTROINTESTINAL:  No abdominal pain, nausea, vomiting, or blood in the stool.    GENITOURINARY:  No dysuria or hematuria.    MUSCULOSKELETAL:  No any new back pain or arthralgias.     NEUROLOGICAL:  No tingling or numbness. No new headache or dizziness.     LYMPHATICS:  Denies any abnormal swollen and anywhere in the body.    SKIN:  Erythema on the neck on the left side.        PHYSICAL EXAMINATION:      VITAL SIGNS:  /83   Pulse 62   Temp 97.9 °F (36.6 °C) (Temporal Artery )   Resp 16   Ht 177.8 cm (70\")   Wt 64.1 kg (141 lb 5 oz)   BMI 20.28 kg/m²     GENERAL:  Not in any distress.    HEENT:  Normocephalic, Atraumatic.Mild Conjunctival pallor. No icterus. Extraocular Movements Intact. No Fascial Asymmetry noted.    NECK:  No adenopathy. No JVD.  Evidence of  Previous surgery on the left side of neck.    RESPIRATORY:  Fair air entry bilateral. No rhonchi or wheezing.    CARDIOVASCULAR:  S1, S2. Regular rate and rhythm. No murmur or gallop appreciated.    ABDOMEN:  Soft, obese, nontender. Bowel sounds present in all four quadrants.  No organomegaly appreciated.    EXTREMITIES:  No edema.No Calf Tenderness.    NEUROLOGIC:  Alert, awake and oriented ×3.  No  Motor or sensory deficit appreciated. Cranial Nerves 2-12 grossly intact.          DIAGNOSTIC DATA:    Glucose   Date Value Ref Range Status   03/16/2018 92 60 - 100 mg/dL Final     Sodium   Date Value Ref Range Status   03/16/2018 143 137 - 145 mmol/L Final     Potassium   Date Value Ref Range Status   03/16/2018 4.1 3.5 - 5.1 " mmol/L Final     CO2   Date Value Ref Range Status   03/16/2018 29.0 22.0 - 31.0 mmol/L Final     Chloride   Date Value Ref Range Status   03/16/2018 100 95 - 110 mmol/L Final     Anion Gap   Date Value Ref Range Status   03/16/2018 14.0 5.0 - 15.0 mmol/L Final     Creatinine   Date Value Ref Range Status   03/16/2018 0.95 0.70 - 1.30 mg/dL Final     BUN   Date Value Ref Range Status   03/16/2018 13 7 - 21 mg/dL Final     BUN/Creatinine Ratio   Date Value Ref Range Status   03/16/2018 13.7 7.0 - 25.0 Final     Calcium   Date Value Ref Range Status   03/16/2018 9.4 8.4 - 10.2 mg/dL Final     eGFR Non  Amer   Date Value Ref Range Status   03/16/2018 80 49 - 113 mL/min/1.73 Final     Alkaline Phosphatase   Date Value Ref Range Status   03/16/2018 89 38 - 126 U/L Final     Total Protein   Date Value Ref Range Status   03/16/2018 7.7 6.3 - 8.6 g/dL Final     ALT (SGPT)   Date Value Ref Range Status   03/16/2018 32 21 - 72 U/L Final     AST (SGOT)   Date Value Ref Range Status   03/16/2018 26 17 - 59 U/L Final     Total Bilirubin   Date Value Ref Range Status   03/16/2018 0.7 0.2 - 1.3 mg/dL Final     Albumin   Date Value Ref Range Status   03/16/2018 4.60 3.40 - 4.80 g/dL Final     Globulin   Date Value Ref Range Status   03/16/2018 3.1 2.3 - 3.5 gm/dL Final     A/G Ratio   Date Value Ref Range Status   03/16/2018 1.5 1.1 - 1.8 g/dL Final     Lab Results   Component Value Date    WBC 4.83 03/16/2018    HGB 15.2 03/16/2018    HCT 44.0 03/16/2018    MCV 90.3 03/16/2018     03/16/2018     Lab Results   Component Value Date    NEUTROABS 3.43 03/16/2018           ASSESSMENT AND PLAN:      1.  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  Patient did have 2 lymph nodes positive at the time of surgery.  Patient was seen in consultation by  on October 21, 2016.  Patient did get 3 weekly dose of carboplatin and Taxol from November 21, 2016 until December 5,  2016 with radiation.  After that in view of side effects from chemotherapy patient decided not to undergo any more chemotherapy and finished radiation on  on January 19, 2017.  She'll stress he has seen Dr. Gant 6 months ago in [place].  Recommend following up with ENT very closely for ENT exams.  Patient states he was moved to [facility] and is in process of getting ENT physician as well as oncologist over that.  We will give him when necessary appointment.    2.  History of nicotine addiction    3.  Rheumatoid arthritis    4.  Health maintenance: Patient does not smoke.  He remains full code.         Keagan Alberts MD  3/16/2018  1:36 PM        EMR Dragon/Transcription disclaimer:   Much of this encounter note is an electronic transcription/translation of spoken language to printed text. The electronic translation of spoken language may permit erroneous, or at times, nonsensical words or phrases to be inadvertently transcribed; Although I have reviewed the note for such errors, some may still exist.

## 2018-09-25 ENCOUNTER — HOSPITAL ENCOUNTER (OUTPATIENT)
Dept: CT IMAGING | Facility: HOSPITAL | Age: 63
Discharge: HOME | End: 2018-09-25
Admitting: OTOLARYNGOLOGY

## 2018-09-25 DIAGNOSIS — C09.9 SQUAMOUS CELL CARCINOMA OF LEFT TONSIL (HCC): ICD-10-CM

## 2018-09-25 PROCEDURE — 70491 CT SOFT TISSUE NECK W/DYE: CPT

## 2018-09-25 PROCEDURE — 25010000002 IOPAMIDOL 61 % SOLUTION: Performed by: FAMILY MEDICINE

## 2018-09-25 RX ADMIN — IOPAMIDOL 80 ML: 612 INJECTION, SOLUTION INTRAVENOUS at 12:23
